# Patient Record
Sex: FEMALE | Race: WHITE | Employment: FULL TIME | URBAN - METROPOLITAN AREA
[De-identification: names, ages, dates, MRNs, and addresses within clinical notes are randomized per-mention and may not be internally consistent; named-entity substitution may affect disease eponyms.]

---

## 2022-09-09 PROBLEM — E66.9 OBESITY: Status: ACTIVE | Noted: 2022-09-09

## 2022-09-09 PROBLEM — N94.6 DYSMENORRHEA: Status: ACTIVE | Noted: 2022-09-09

## 2022-09-13 ENCOUNTER — OFFICE VISIT (OUTPATIENT)
Dept: OBGYN CLINIC | Age: 47
End: 2022-09-13
Payer: COMMERCIAL

## 2022-09-13 VITALS
SYSTOLIC BLOOD PRESSURE: 114 MMHG | WEIGHT: 166.5 LBS | BODY MASS INDEX: 25.23 KG/M2 | HEIGHT: 68 IN | DIASTOLIC BLOOD PRESSURE: 68 MMHG

## 2022-09-13 DIAGNOSIS — N95.1 MENOPAUSAL SYNDROME: ICD-10-CM

## 2022-09-13 DIAGNOSIS — N95.0 PMB (POSTMENOPAUSAL BLEEDING): Primary | ICD-10-CM

## 2022-09-13 PROCEDURE — 58100 BIOPSY OF UTERUS LINING: CPT | Performed by: OBSTETRICS & GYNECOLOGY

## 2022-09-13 RX ORDER — TOPIRAMATE 50 MG/1
TABLET, FILM COATED ORAL
COMMUNITY

## 2022-09-13 NOTE — PROGRESS NOTES
Wilberto Verma is a No obstetric history on file. , 41 y.o. female   No LMP recorded (lmp unknown). (Menstrual status: Menopause). She presents for her problem    She is having  3-4 days of spotting last week, no cycle for over 1 year, had HF and NS  initially awhile ago but have subsided, does note some hair loss . Menstrual status:  Cycles are menopausal.    Flow: spotting. She does not have dysmenorrhea. Medical conditions:  Since her last annual GYN exam about  ? years ago, she has not the following changes in her health history: none. Mammogram History:    NORMAN Results (most recent):  No results found for this or any previous visit. DEXA Results (most recent):  No results found for this or any previous visit. Past Medical History:   Diagnosis Date    Dysmenorrhea 09/09/2022    Obesity 09/09/2022    PMB (postmenopausal bleeding) 09/13/2022     History reviewed. No pertinent surgical history. Prior to Admission medications    Medication Sig Start Date End Date Taking? Authorizing Provider   topiramate (TOPAMAX) 50 mg tablet topiramate 50 mg tablet   Yes Provider, Historical       No Known Allergies       Tobacco History:  reports that she has never smoked. She has never used smokeless tobacco.  Alcohol use:  reports that she does not currently use alcohol. Drug use:  reports no history of drug use. Family Medical/Cancer History:   Family History   Family history unknown: Yes          Review of Systems   Constitutional:  Negative for chills, fever, malaise/fatigue and weight loss. HENT:  Negative for congestion, ear pain, sinus pain and tinnitus. Eyes:  Negative for blurred vision and double vision. Respiratory:  Negative for cough, shortness of breath and wheezing. Cardiovascular:  Negative for chest pain and palpitations. Gastrointestinal:  Negative for abdominal pain, blood in stool, constipation, diarrhea, heartburn, nausea and vomiting. Genitourinary:  Negative for dysuria, flank pain, frequency, hematuria and urgency. Musculoskeletal:  Negative for joint pain and myalgias. Skin:  Negative for itching and rash. Neurological:  Negative for dizziness, weakness and headaches. Psychiatric/Behavioral:  Negative for depression, memory loss and suicidal ideas. The patient is not nervous/anxious and does not have insomnia. Physical Exam  Constitutional:       Appearance: Normal appearance. HENT:      Head: Normocephalic and atraumatic. Abdominal:      General: Abdomen is flat. Palpations: Abdomen is soft. Genitourinary:     General: Normal vulva. Vagina: Normal.      Cervix: Normal.      Uterus: Normal.       Adnexa: Right adnexa normal and left adnexa normal.      Rectum: Normal.      Comments: No PAP obtained    After verbal consent and under sterile conditions and EMBX was performed- Anterior lip of cervix grasped, endocervical canal dilated, 2 passes made with minimal tissue, sounded to 8 cms, pt tolerated the EMBX without issue, watched for 5 minutes post  Neurological:      Mental Status: She is alert. Psychiatric:         Mood and Affect: Mood normal.         Behavior: Behavior normal.         Thought Content: Thought content normal.        Visit Vitals  /68 (BP 1 Location: Right arm, BP Patient Position: Sitting, BP Cuff Size: Small adult)   Ht 5' 8\" (1.727 m)   Wt 166 lb 8 oz (75.5 kg)   LMP  (LMP Unknown) Comment: MURTAZA   BMI 25.32 kg/m²         Assessment: Diagnoses and all orders for this visit:    1. PMB (postmenopausal bleeding)  -     SURGICAL PATHOLOGY    2. Menopausal syndrome    DWP Diff.  Dx, will call with PATH    Plan: Questions addressed  Counseled re: diet, exercise, healthy lifestyle  Return for Annual w/ pap  Rec annual mammogram

## 2022-09-13 NOTE — PROGRESS NOTES
Chief Complaint   Patient presents with    Post Menopausal Bleeding     Visit Vitals  Ht 5' 8\" (1.727 m)   Wt 166 lb 8 oz (75.5 kg)   BMI 25.32 kg/m²

## 2022-09-16 LAB
CPT DISCLAIMER: NORMAL
DIAGNOSIS SYNOPSIS:: NORMAL
DX ICD CODE: NORMAL
PATH REPORT.FINAL DX SPEC: NORMAL
PATH REPORT.GROSS SPEC: NORMAL
PATH REPORT.SITE OF ORIGIN SPEC: NORMAL
PATHOLOGIST NAME: NORMAL
PAYMENT PROCEDURE: NORMAL

## 2023-04-12 ENCOUNTER — TELEPHONE (OUTPATIENT)
Dept: OBGYN CLINIC | Age: 48
End: 2023-04-12

## 2023-04-20 ENCOUNTER — HOSPITAL ENCOUNTER (OUTPATIENT)
Dept: MAMMOGRAPHY | Age: 48
End: 2023-04-20
Attending: OBSTETRICS & GYNECOLOGY
Payer: COMMERCIAL

## 2023-04-20 ENCOUNTER — TRANSCRIBE ORDER (OUTPATIENT)
Dept: SCHEDULING | Age: 48
End: 2023-04-20

## 2023-04-20 ENCOUNTER — TELEPHONE (OUTPATIENT)
Dept: OBGYN CLINIC | Age: 48
End: 2023-04-20

## 2023-04-20 ENCOUNTER — HOSPITAL ENCOUNTER (OUTPATIENT)
Dept: MAMMOGRAPHY | Age: 48
Discharge: HOME | End: 2023-04-20
Attending: OBSTETRICS & GYNECOLOGY
Payer: COMMERCIAL

## 2023-04-20 DIAGNOSIS — N63.10 MASS OF RIGHT BREAST, UNSPECIFIED QUADRANT: ICD-10-CM

## 2023-04-20 DIAGNOSIS — R92.8 ABNORMAL MAMMOGRAM: Primary | ICD-10-CM

## 2023-04-20 PROCEDURE — 77062 BREAST TOMOSYNTHESIS BI: CPT

## 2023-04-20 PROCEDURE — 76642 ULTRASOUND BREAST LIMITED: CPT

## 2023-04-20 NOTE — PROGRESS NOTES
Abnormal right mammo and US  Rec;  Bx of both breast lesion and LN  Radiologist DWP   Orders sent for bx to be set up

## 2023-04-21 ENCOUNTER — OFFICE VISIT (OUTPATIENT)
Dept: OBGYN CLINIC | Age: 48
End: 2023-04-21

## 2023-04-21 VITALS
SYSTOLIC BLOOD PRESSURE: 114 MMHG | HEIGHT: 68 IN | DIASTOLIC BLOOD PRESSURE: 76 MMHG | BODY MASS INDEX: 24.86 KG/M2 | WEIGHT: 164 LBS

## 2023-04-21 DIAGNOSIS — Z12.4 SCREENING FOR MALIGNANT NEOPLASM OF CERVIX: Primary | ICD-10-CM

## 2023-04-21 DIAGNOSIS — Z01.419 ROUTINE GYNECOLOGICAL EXAMINATION: ICD-10-CM

## 2023-04-21 DIAGNOSIS — N63.15 MASS OVERLAPPING MULTIPLE QUADRANTS OF RIGHT BREAST: ICD-10-CM

## 2023-04-21 NOTE — PROGRESS NOTES
Karolina Varela is a , 41 y.o. female   No LMP recorded. (Menstrual status: Menopause). She presents for her annual    She is having no significant problems. Had mammogram -biopsy scheduled for next week      Menstrual status:  Cycles are menopausal.    Flow: absent. She does not have dysmenorrhea. Medical conditions:  Since her last annual GYN exam about one year ago, she has not the following changes in her health history: none. Mammogram History:    NORMAN Results (most recent):  Results from [place] encounter on 23    [facility] 3D LENCHO W MAMMO BI DX INCL CAD    Narrative  STUDY:  Bilateral Diagnostic Digital Mammography including 3-D Tomosynthesis,  with right breast ultrasound    INDICATION: Palpable abnormality in the right breast    COMPARISON:  Baseline study    BREAST COMPOSITION: There are scattered areas of fibroglandular density. FINDINGS: Bilateral digital diagnostic mammography was performed, and is  interpreted in conjunction with a computer assisted detection (CAD) system. In  addition to standard 2-D bilateral CC and MLO views, bilateral 3-D/tomosynthesis  was performed in CC and MLO projections. Implant displaced views were performed. Spot compression views were performed of the region of palpable concern, the  anterior third of the right breast upper inner quadrant. There is an irregular  ill-defined equal density mass in the region of palpable concern. There are no  suspicious findings within the left breast. There is no skin thickening or  nipple retraction. There are bilateral intact submuscular saline breast  implants. Right breast ultrasound was performed of the region of palpable concern, the  1:00 location, 3 cm from the nipple. There is a 1.5 cm irregular ill-defined  hypoechoic hypervascular mass accounting for the region of palpable concern. This represents a suspicious solid mass.  Survey imaging of the right axilla  demonstrates an abnormal level 1 axillary lymph node with diffuse cortical  thickening; this lymph node measures up to 1.3 x 0.9 x 1.1 cm. A submuscular  implant is confirmed. Impression  1. BI-RADS Assessment Category 5: Highly suggestive of malignancy- Appropriate  action should be taken. Palpable 1.5 cm solid mass in the upper inner quadrant  of the right breast, representing a highly suspicious breast mass. Ultrasound-guided core needle biopsy is recommended. 2. BI-RADS Assessment Category 4: Suspicious abnormality - Biopsy should be  performed in the absence of clinical contraindication. Single abnormal level 1  right axillary lymph node. Ultrasound-guided core needle biopsy is recommended. 3. No mammographic evidence of malignancy within the left breast.  4. Intact bilateral submuscular saline breast implants. Ultrasound-guided biopsy is recommended of the palpable right breast mass and of  the single abnormal right axillary lymph node. I have informed the patient of these results and recommendations. We will inform  the office of the referring physician and assist in scheduling these breast  biopsy appointments. DEXA Results (most recent):  No results found for this or any previous visit. Past Medical History:   Diagnosis Date    Dysmenorrhea 09/09/2022    Obesity 09/09/2022    PMB (postmenopausal bleeding) 09/13/2022     Past Surgical History:   Procedure Laterality Date    IMPLANT BREAST SILICONE/EQ         Prior to Admission medications    Medication Sig Start Date End Date Taking? Authorizing Provider   topiramate (TOPAMAX) 50 mg tablet topiramate 50 mg tablet   Yes Provider, Historical       No Known Allergies       Tobacco History:  reports that she has never smoked. She has never used smokeless tobacco.  Alcohol use:  reports that she does not currently use alcohol. Drug use:  reports no history of drug use. Family Medical/Cancer History:   Family History   Family history unknown:  Yes Review of Systems   Constitutional:  Negative for chills, fever, malaise/fatigue and weight loss. HENT:  Negative for congestion, ear pain, sinus pain and tinnitus. Eyes:  Negative for blurred vision and double vision. Respiratory:  Negative for cough, shortness of breath and wheezing. Cardiovascular:  Negative for chest pain and palpitations. Gastrointestinal:  Negative for abdominal pain, blood in stool, constipation, diarrhea, heartburn, nausea and vomiting. Genitourinary:  Negative for dysuria, flank pain, frequency, hematuria and urgency. Musculoskeletal:  Negative for joint pain and myalgias. Skin:  Negative for itching and rash. Neurological:  Negative for dizziness, weakness and headaches. Psychiatric/Behavioral:  Negative for depression, memory loss and suicidal ideas. The patient is not nervous/anxious and does not have insomnia. Physical Exam  Constitutional:       Appearance: Normal appearance. HENT:      Head: Normocephalic and atraumatic. Cardiovascular:      Rate and Rhythm: Normal rate. Heart sounds: Normal heart sounds. Pulmonary:      Effort: Pulmonary effort is normal.      Breath sounds: Normal breath sounds. Chest:   Breasts:     Right: Normal.      Left: Normal.          Comments: Implants  Abdominal:      General: Abdomen is flat. Palpations: Abdomen is soft. Genitourinary:     General: Normal vulva. Vagina: Normal.      Cervix: Normal.      Uterus: Normal.       Adnexa: Right adnexa normal and left adnexa normal.      Rectum: Normal.      Comments: PAP Obtained  Neurological:      Mental Status: She is alert. Psychiatric:         Mood and Affect: Mood normal.         Behavior: Behavior normal.         Thought Content:  Thought content normal.        Visit Vitals  /76 (BP 1 Location: Left upper arm, BP Patient Position: Sitting, BP Cuff Size: Small adult)   Ht 5' 8\" (1.727 m)   Wt 164 lb (74.4 kg)   BMI 24.94 kg/m² Assessment: Diagnoses and all orders for this visit:    1. Screening for malignant neoplasm of cervix  -     PAP IG, RFX APTIMA HPV ASCUS ([id number])    2. Routine gynecological examination  -     PAP IG, RFX APTIMA HPV ASCUS ([id number])    3. Mass overlapping multiple quadrants of right breast    Breast lump- bx next week scheduled    Plan: Questions addressed  Counseled re: diet, exercise, healthy lifestyle  Return for Annual  Rec annual mammogram        Follow-up and Dispositions    Return for 1 yr annual, 1 yr mammo.

## 2023-04-21 NOTE — PROGRESS NOTES
Chief Complaint   Patient presents with    Annual Exam     Mammo/us 4-20-23     Visit Vitals  /76 (BP 1 Location: Left upper arm, BP Patient Position: Sitting, BP Cuff Size: Small adult)   Ht 5' 8\" (1.727 m)   Wt 164 lb (74.4 kg)   BMI 24.94 kg/m²

## 2023-04-26 LAB
CYTOLOGIST CVX/VAG CYTO: NORMAL
CYTOLOGY CVX/VAG DOC CYTO: NORMAL
CYTOLOGY CVX/VAG DOC THIN PREP: NORMAL
DX ICD CODE: NORMAL
LABCORP, 190119: NORMAL
Lab: NORMAL
OTHER STN SPEC: NORMAL
STAT OF ADQ CVX/VAG CYTO-IMP: NORMAL

## 2023-04-27 ENCOUNTER — HOSPITAL ENCOUNTER (OUTPATIENT)
Dept: MAMMOGRAPHY | Age: 48
End: 2023-04-27
Attending: OBSTETRICS & GYNECOLOGY
Payer: COMMERCIAL

## 2023-04-27 DIAGNOSIS — R92.8 ABNORMAL MAMMOGRAM: ICD-10-CM

## 2023-04-27 PROCEDURE — 88305 TISSUE EXAM BY PATHOLOGIST: CPT

## 2023-04-27 PROCEDURE — 77030013882 US BX BREAST RT 1ST LESION W/CLIP AND SPECIMEN

## 2023-04-27 PROCEDURE — 38505 NEEDLE BIOPSY LYMPH NODES: CPT

## 2023-04-27 PROCEDURE — 88360 TUMOR IMMUNOHISTOCHEM/MANUAL: CPT

## 2023-04-27 PROCEDURE — 77065 DX MAMMO INCL CAD UNI: CPT

## 2023-04-27 PROCEDURE — 88342 IMHCHEM/IMCYTCHM 1ST ANTB: CPT

## 2023-04-27 RX ORDER — LIDOCAINE HYDROCHLORIDE AND EPINEPHRINE 10; 10 MG/ML; UG/ML
1.5 INJECTION, SOLUTION INFILTRATION; PERINEURAL
Status: DISPENSED | OUTPATIENT
Start: 2023-04-27 | End: 2023-04-27

## 2023-04-27 RX ORDER — LIDOCAINE HYDROCHLORIDE 10 MG/ML
1.5 INJECTION INFILTRATION; PERINEURAL
Status: DISPENSED | OUTPATIENT
Start: 2023-04-27 | End: 2023-04-27

## 2023-05-03 ENCOUNTER — TRANSCRIBE ORDER (OUTPATIENT)
Dept: SCHEDULING | Age: 48
End: 2023-05-03

## 2023-05-03 DIAGNOSIS — C50.911 HER2-POSITIVE CARCINOMA OF RIGHT BREAST (HCC): Primary | ICD-10-CM

## 2023-05-09 ENCOUNTER — HOSPITAL ENCOUNTER (OUTPATIENT)
Facility: HOSPITAL | Age: 48
Discharge: HOME | End: 2023-05-12
Payer: COMMERCIAL

## 2023-05-09 DIAGNOSIS — C50.911 HER2-POSITIVE CARCINOMA OF RIGHT BREAST (HCC): ICD-10-CM

## 2023-05-09 PROCEDURE — A9579 GAD-BASE MR CONTRAST NOS,1ML: HCPCS

## 2023-05-09 PROCEDURE — 6360000004 HC RX CONTRAST MEDICATION

## 2023-05-09 PROCEDURE — C8908 MRI W/O FOL W/CONT, BREAST,: HCPCS

## 2023-05-09 RX ORDER — SODIUM CHLORIDE 0.9 % (FLUSH) 0.9 %
10 SYRINGE (ML) INJECTION 2 TIMES DAILY
Status: DISCONTINUED | OUTPATIENT
Start: 2023-05-09 | End: 2023-05-13 | Stop reason: HOSPADM

## 2023-05-09 RX ORDER — 0.9 % SODIUM CHLORIDE 0.9 %
100 INTRAVENOUS SOLUTION INTRAVENOUS ONCE
Status: DISCONTINUED | OUTPATIENT
Start: 2023-05-09 | End: 2023-05-13 | Stop reason: HOSPADM

## 2023-05-09 RX ADMIN — GADOTERIDOL 15 ML: 279.3 INJECTION, SOLUTION INTRAVENOUS at 13:57

## 2023-05-10 ENCOUNTER — TELEPHONE (OUTPATIENT)
Facility: HOSPITAL | Age: 48
End: 2023-05-10

## 2023-05-10 NOTE — TELEPHONE ENCOUNTER
Called pt's PCP office to confirm whether she has been referred to a breast surgeon in f/u on breast MRI done yesterday. She has been referred to Dr. Matthew Trejo, [facility] AND [facility] breast surgeon. Called Dr. Mike Fletcher office to confirm patient has appointment there, she has an appointment this Friday, May 12th and orders for breast MRI in their system as well. I let them know that she had her breast MRI done here yesterday and we can transfer the result to them if they fax a release per HIPAA. They faxed a release while I was on the phone with them, appreciated my call.

## 2023-05-10 NOTE — TELEPHONE ENCOUNTER
[address]  Breast Cancer Nurse Navigator Encounter    Name: Hadley Fuentes  Age: 57 y.o.  : 1975  Diagnosis: Right breast IDC; ER-/AR-/HER2+    Encounter type:  [x]Initial Navigator Encounter  []Patient Initiated  []Navigator Follow-up  []Other:     Narrative:   Called pt to introduce self/role of NN and answer any questions she may have. She confirms having had consults and port placement with Dr. Stephy Lynne and a follow-up scheduled with medical oncology on Friday. No specific questions/concerns for NN at this time. Contact info given and pt encouraged to reach out if needed.      Interdisciplinary Team:  Surg-Onc: Fatuma Hammer MD  Med-Onc:  Rad-Onc:  Plastics:  :   Nurse Navigator: Dilip Knight, RN      Dilip Knight BSN, RN, VIA [facility]  Breast Cancer Nurse [address] [place] [address]  W: [phone]  F: [phone]  [email or web address]   Good Help to Those in [facility]

## 2023-05-24 RX ORDER — TOPIRAMATE 50 MG/1
TABLET, FILM COATED ORAL
COMMUNITY

## 2023-08-08 ENCOUNTER — HOSPITAL ENCOUNTER (OUTPATIENT)
Facility: HOSPITAL | Age: 48
Discharge: HOME | End: 2023-08-11
Attending: INTERNAL MEDICINE
Payer: COMMERCIAL

## 2023-08-08 DIAGNOSIS — Z51.11 ENCOUNTER FOR ANTINEOPLASTIC CHEMOTHERAPY: ICD-10-CM

## 2023-08-08 DIAGNOSIS — R93.5 ABNORMAL ABDOMINAL ULTRASOUND: ICD-10-CM

## 2023-08-08 DIAGNOSIS — Z51.12 ENCOUNTER FOR ANTINEOPLASTIC IMMUNOTHERAPY: ICD-10-CM

## 2023-08-08 DIAGNOSIS — K76.89 COMPENSATORY LOBAR HYPERPLASIA OF LIVER: ICD-10-CM

## 2023-08-08 DIAGNOSIS — C50.211 MALIGNANT NEOPLASM OF UPPER-INNER QUADRANT OF RIGHT FEMALE BREAST, UNSPECIFIED ESTROGEN RECEPTOR STATUS (HCC): ICD-10-CM

## 2023-08-08 PROCEDURE — C8908 MRI W/O FOL W/CONT, BREAST,: HCPCS

## 2023-08-08 PROCEDURE — 6360000004 HC RX CONTRAST MEDICATION

## 2023-08-08 PROCEDURE — A9579 GAD-BASE MR CONTRAST NOS,1ML: HCPCS

## 2023-08-08 PROCEDURE — 2580000003 HC RX 258: Performed by: INTERNAL MEDICINE

## 2023-08-08 RX ORDER — 0.9 % SODIUM CHLORIDE 0.9 %
100 INTRAVENOUS SOLUTION INTRAVENOUS ONCE
Status: COMPLETED | OUTPATIENT
Start: 2023-08-08 | End: 2023-08-08

## 2023-08-08 RX ADMIN — SODIUM CHLORIDE 100 ML: 9 INJECTION, SOLUTION INTRAVENOUS at 15:44

## 2023-08-08 RX ADMIN — GADOTERIDOL 15 ML: 279.3 INJECTION, SOLUTION INTRAVENOUS at 15:44

## 2023-10-18 ENCOUNTER — HOSPITAL ENCOUNTER (OUTPATIENT)
Facility: HOSPITAL | Age: 48
Discharge: HOME | End: 2023-10-21
Attending: INTERNAL MEDICINE
Payer: COMMERCIAL

## 2023-10-18 DIAGNOSIS — K76.89 COMPENSATORY LOBAR HYPERPLASIA OF LIVER: ICD-10-CM

## 2023-10-18 DIAGNOSIS — R93.5 ABNORMAL ABDOMINAL ULTRASOUND: ICD-10-CM

## 2023-10-18 DIAGNOSIS — Z51.12 ENCOUNTER FOR ANTINEOPLASTIC IMMUNOTHERAPY: ICD-10-CM

## 2023-10-18 DIAGNOSIS — Z51.11 ENCOUNTER FOR ANTINEOPLASTIC CHEMOTHERAPY: ICD-10-CM

## 2023-10-18 PROCEDURE — C8908 MRI W/O FOL W/CONT, BREAST,: HCPCS

## 2023-10-18 PROCEDURE — 6360000004 HC RX CONTRAST MEDICATION: Performed by: INTERNAL MEDICINE

## 2023-10-18 PROCEDURE — A9585 GADOBUTROL INJECTION: HCPCS | Performed by: INTERNAL MEDICINE

## 2023-10-18 RX ORDER — GADOBUTROL 604.72 MG/ML
7 INJECTION INTRAVENOUS
Status: COMPLETED | OUTPATIENT
Start: 2023-10-18 | End: 2023-10-18

## 2023-10-18 RX ADMIN — GADOBUTROL 7 ML: 604.72 INJECTION INTRAVENOUS at 10:34

## 2024-07-25 ENCOUNTER — OFFICE VISIT (OUTPATIENT)
Age: 49
End: 2024-07-25
Payer: COMMERCIAL

## 2024-07-25 VITALS
HEIGHT: 68 IN | DIASTOLIC BLOOD PRESSURE: 68 MMHG | SYSTOLIC BLOOD PRESSURE: 112 MMHG | WEIGHT: 158.25 LBS | BODY MASS INDEX: 23.98 KG/M2

## 2024-07-25 DIAGNOSIS — Z90.13 H/O BILATERAL MASTECTOMY: ICD-10-CM

## 2024-07-25 DIAGNOSIS — Z12.4 PAP SMEAR FOR CERVICAL CANCER SCREENING: Primary | ICD-10-CM

## 2024-07-25 DIAGNOSIS — Z01.419 GYNECOLOGIC EXAM NORMAL: ICD-10-CM

## 2024-07-25 DIAGNOSIS — Z85.3 PERSONAL HISTORY OF BREAST CANCER: ICD-10-CM

## 2024-07-25 PROCEDURE — 99396 PREV VISIT EST AGE 40-64: CPT | Performed by: OBSTETRICS & GYNECOLOGY

## 2024-07-25 ASSESSMENT — ENCOUNTER SYMPTOMS
GASTROINTESTINAL NEGATIVE: 1
RESPIRATORY NEGATIVE: 1

## 2024-07-25 NOTE — PROGRESS NOTES
Zeny Chandler is a No obstetric history on file., 49 y.o. female   No LMP recorded. (Menstrual status: Menopause).    She presents for her annual    She is having no significant problems.      Menstrual status:  Cycles are menopausal.    Flow: absent.      She does not have dysmenorrhea.      Medical conditions:  Since her last annual GYN exam about one year ago, she has not the following changes in her health history: none.     Mammogram History:    TIMOTHY Results (most recent):  @[employer]([id number])@     DEXA Results (most recent):  @[employer]([id number]:1)@       Past Medical History:   Diagnosis Date    Breast cancer (HCC)     Dysmenorrhea 09/09/2022    Obesity 09/09/2022    PMB (postmenopausal bleeding) 09/13/2022     Past Surgical History:   Procedure Laterality Date    IMPLANT BREAST SILICONE/EQ      MASTECTOMY, BILATERAL Bilateral 11/2023    US BREAST BIOPSY W LOC DEVICE 1ST LESION RIGHT Right 04/27/2023    US BREAST BIOPSY W LOC DEVICE 1ST LESION RIGHT 4/27/2023 [facility]    US LYMPH NODE BIOPSY  04/27/2023    US LYMPH NODE BIOPSY 4/27/2023 [facility]       Prior to Admission medications    Medication Sig Start Date End Date Taking? Authorizing Provider   topiramate (TOPAMAX) 50 MG tablet topiramate 50 mg tablet   Yes Automatic Reconciliation, Ar       No Known Allergies       Tobacco History:  reports that she has never smoked. She has never used smokeless tobacco.  Alcohol use:  reports that she does not currently use alcohol.  Drug use:  reports no history of drug use.    Family Medical/Cancer History:   Family History   Family history unknown: Yes        Review of Systems   Constitutional: Negative.    Respiratory: Negative.     Cardiovascular: Negative.    Gastrointestinal: Negative.    Genitourinary: Negative.    Musculoskeletal: Negative.    Neurological: Negative.    Psychiatric/Behavioral: Negative.           /68 (Site: Right Upper Arm, Position: Sitting, Cuff Size: Small

## 2024-07-25 NOTE — PROGRESS NOTES
Chief Complaint   Patient presents with    Annual Exam     Bilateral mastectomy     /68 (Site: Right Upper Arm, Position: Sitting, Cuff Size: Small Adult)   Ht 1.727 m (5' 8\")   Wt 71.8 kg (158 lb 4 oz)   BMI 24.06 kg/m²

## 2024-07-30 LAB
., LABCORP: NORMAL
CYTOLOGIST CVX/VAG CYTO: NORMAL
CYTOLOGY CVX/VAG DOC CYTO: NORMAL
CYTOLOGY CVX/VAG DOC THIN PREP: NORMAL
DX ICD CODE: NORMAL
Lab: NORMAL
OTHER STN SPEC: NORMAL
STAT OF ADQ CVX/VAG CYTO-IMP: NORMAL

## 2025-08-27 ENCOUNTER — ANESTHESIA EVENT (OUTPATIENT)
Facility: HOSPITAL | Age: 50
End: 2025-08-27
Payer: COMMERCIAL

## 2025-08-27 ENCOUNTER — ANESTHESIA (OUTPATIENT)
Facility: HOSPITAL | Age: 50
End: 2025-08-27
Payer: COMMERCIAL

## 2025-08-27 ENCOUNTER — HOSPITAL ENCOUNTER (OUTPATIENT)
Facility: HOSPITAL | Age: 50
Discharge: HOME | End: 2025-08-27
Attending: SURGERY | Admitting: SURGERY
Payer: COMMERCIAL

## 2025-08-27 VITALS
RESPIRATION RATE: 16 BRPM | HEART RATE: 70 BPM | DIASTOLIC BLOOD PRESSURE: 87 MMHG | HEIGHT: 71 IN | WEIGHT: 160 LBS | TEMPERATURE: 98.1 F | SYSTOLIC BLOOD PRESSURE: 120 MMHG | OXYGEN SATURATION: 95 % | BODY MASS INDEX: 22.4 KG/M2

## 2025-08-27 DIAGNOSIS — C77.9 LYMPH NODE CANCER (HCC): Primary | ICD-10-CM

## 2025-08-27 DIAGNOSIS — C77.9 MALIGNANT NEOPLASM OF LYMPH NODE (HCC): ICD-10-CM

## 2025-08-27 PROCEDURE — 88305 TISSUE EXAM BY PATHOLOGIST: CPT

## 2025-08-27 PROCEDURE — 3600000003 HC SURGERY LEVEL 3 BASE: Performed by: SURGERY

## 2025-08-27 PROCEDURE — 7100000010 HC PHASE II RECOVERY - FIRST 15 MIN: Performed by: SURGERY

## 2025-08-27 PROCEDURE — 7100000001 HC PACU RECOVERY - ADDTL 15 MIN: Performed by: SURGERY

## 2025-08-27 PROCEDURE — 6360000002 HC RX W HCPCS: Performed by: SURGERY

## 2025-08-27 PROCEDURE — 7100000011 HC PHASE II RECOVERY - ADDTL 15 MIN: Performed by: SURGERY

## 2025-08-27 PROCEDURE — 2500000003 HC RX 250 WO HCPCS: Performed by: SURGERY

## 2025-08-27 PROCEDURE — 88342 IMHCHEM/IMCYTCHM 1ST ANTB: CPT

## 2025-08-27 PROCEDURE — 88333 PATH CONSLTJ SURG CYTO XM 1: CPT

## 2025-08-27 PROCEDURE — 2720000010 HC SURG SUPPLY STERILE: Performed by: SURGERY

## 2025-08-27 PROCEDURE — 3700000000 HC ANESTHESIA ATTENDED CARE: Performed by: SURGERY

## 2025-08-27 PROCEDURE — 7100000000 HC PACU RECOVERY - FIRST 15 MIN: Performed by: SURGERY

## 2025-08-27 PROCEDURE — 88360 TUMOR IMMUNOHISTOCHEM/MANUAL: CPT

## 2025-08-27 PROCEDURE — 2580000003 HC RX 258: Performed by: SURGERY

## 2025-08-27 PROCEDURE — 6370000000 HC RX 637 (ALT 250 FOR IP): Performed by: ANESTHESIOLOGY

## 2025-08-27 PROCEDURE — 6360000002 HC RX W HCPCS

## 2025-08-27 PROCEDURE — 3600000013 HC SURGERY LEVEL 3 ADDTL 15MIN: Performed by: SURGERY

## 2025-08-27 PROCEDURE — 88341 IMHCHEM/IMCYTCHM EA ADD ANTB: CPT

## 2025-08-27 PROCEDURE — 3700000001 HC ADD 15 MINUTES (ANESTHESIA): Performed by: SURGERY

## 2025-08-27 PROCEDURE — 2709999900 HC NON-CHARGEABLE SUPPLY: Performed by: SURGERY

## 2025-08-27 RX ORDER — BUPIVACAINE HYDROCHLORIDE 2.5 MG/ML
INJECTION, SOLUTION EPIDURAL; INFILTRATION; INTRACAUDAL; PERINEURAL PRN
Status: DISCONTINUED | OUTPATIENT
Start: 2025-08-27 | End: 2025-08-27 | Stop reason: HOSPADM

## 2025-08-27 RX ORDER — SODIUM CHLORIDE 0.9 % (FLUSH) 0.9 %
5-40 SYRINGE (ML) INJECTION PRN
Status: DISCONTINUED | OUTPATIENT
Start: 2025-08-27 | End: 2025-08-27 | Stop reason: HOSPADM

## 2025-08-27 RX ORDER — DEXAMETHASONE SODIUM PHOSPHATE 4 MG/ML
INJECTION, SOLUTION INTRA-ARTICULAR; INTRALESIONAL; INTRAMUSCULAR; INTRAVENOUS; SOFT TISSUE
Status: DISCONTINUED | OUTPATIENT
Start: 2025-08-27 | End: 2025-08-27 | Stop reason: SDUPTHER

## 2025-08-27 RX ORDER — GLUCAGON 1 MG/ML
1 KIT INJECTION PRN
Status: DISCONTINUED | OUTPATIENT
Start: 2025-08-27 | End: 2025-08-27 | Stop reason: HOSPADM

## 2025-08-27 RX ORDER — HYDROCODONE BITARTRATE AND ACETAMINOPHEN 5; 325 MG/1; MG/1
1 TABLET ORAL EVERY 6 HOURS PRN
Refills: 0 | Status: DISCONTINUED | OUTPATIENT
Start: 2025-08-27 | End: 2025-08-27 | Stop reason: HOSPADM

## 2025-08-27 RX ORDER — SODIUM CHLORIDE, SODIUM LACTATE, POTASSIUM CHLORIDE, CALCIUM CHLORIDE 600; 310; 30; 20 MG/100ML; MG/100ML; MG/100ML; MG/100ML
INJECTION, SOLUTION INTRAVENOUS ONCE
Status: DISCONTINUED | OUTPATIENT
Start: 2025-08-27 | End: 2025-08-27 | Stop reason: HOSPADM

## 2025-08-27 RX ORDER — SODIUM CHLORIDE, SODIUM LACTATE, POTASSIUM CHLORIDE, CALCIUM CHLORIDE 600; 310; 30; 20 MG/100ML; MG/100ML; MG/100ML; MG/100ML
INJECTION, SOLUTION INTRAVENOUS CONTINUOUS
Status: DISCONTINUED | OUTPATIENT
Start: 2025-08-27 | End: 2025-08-27 | Stop reason: HOSPADM

## 2025-08-27 RX ORDER — PHENYLEPHRINE HCL IN 0.9% NACL 1 MG/10 ML
SYRINGE (ML) INTRAVENOUS
Status: DISCONTINUED | OUTPATIENT
Start: 2025-08-27 | End: 2025-08-27 | Stop reason: SDUPTHER

## 2025-08-27 RX ORDER — MIDAZOLAM HYDROCHLORIDE 1 MG/ML
INJECTION, SOLUTION INTRAMUSCULAR; INTRAVENOUS
Status: DISCONTINUED | OUTPATIENT
Start: 2025-08-27 | End: 2025-08-27 | Stop reason: SDUPTHER

## 2025-08-27 RX ORDER — LABETALOL HYDROCHLORIDE 5 MG/ML
10 INJECTION, SOLUTION INTRAVENOUS
Status: DISCONTINUED | OUTPATIENT
Start: 2025-08-27 | End: 2025-08-27 | Stop reason: HOSPADM

## 2025-08-27 RX ORDER — HYDROCODONE BITARTRATE AND ACETAMINOPHEN 5; 325 MG/1; MG/1
1 TABLET ORAL EVERY 6 HOURS PRN
Qty: 15 TABLET | Refills: 0 | Status: SHIPPED | OUTPATIENT
Start: 2025-08-27 | End: 2025-09-03

## 2025-08-27 RX ORDER — HYDRALAZINE HYDROCHLORIDE 20 MG/ML
10 INJECTION INTRAMUSCULAR; INTRAVENOUS
Status: DISCONTINUED | OUTPATIENT
Start: 2025-08-27 | End: 2025-08-27 | Stop reason: HOSPADM

## 2025-08-27 RX ORDER — PROPOFOL 10 MG/ML
INJECTION, EMULSION INTRAVENOUS
Status: DISCONTINUED | OUTPATIENT
Start: 2025-08-27 | End: 2025-08-27 | Stop reason: SDUPTHER

## 2025-08-27 RX ORDER — SODIUM CHLORIDE 0.9 % (FLUSH) 0.9 %
5-40 SYRINGE (ML) INJECTION EVERY 12 HOURS SCHEDULED
Status: DISCONTINUED | OUTPATIENT
Start: 2025-08-27 | End: 2025-08-27 | Stop reason: HOSPADM

## 2025-08-27 RX ORDER — LORAZEPAM 2 MG/ML
0.5 INJECTION INTRAMUSCULAR
Status: DISCONTINUED | OUTPATIENT
Start: 2025-08-27 | End: 2025-08-27 | Stop reason: HOSPADM

## 2025-08-27 RX ORDER — LIDOCAINE HYDROCHLORIDE 20 MG/ML
INJECTION, SOLUTION EPIDURAL; INFILTRATION; INTRACAUDAL; PERINEURAL
Status: DISCONTINUED | OUTPATIENT
Start: 2025-08-27 | End: 2025-08-27 | Stop reason: SDUPTHER

## 2025-08-27 RX ORDER — OXYCODONE HYDROCHLORIDE 5 MG/1
5 TABLET ORAL PRN
Status: COMPLETED | OUTPATIENT
Start: 2025-08-27 | End: 2025-08-27

## 2025-08-27 RX ORDER — MEPERIDINE HYDROCHLORIDE 25 MG/ML
12.5 INJECTION INTRAMUSCULAR; INTRAVENOUS; SUBCUTANEOUS EVERY 5 MIN PRN
Status: DISCONTINUED | OUTPATIENT
Start: 2025-08-27 | End: 2025-08-27 | Stop reason: HOSPADM

## 2025-08-27 RX ORDER — METOCLOPRAMIDE HYDROCHLORIDE 5 MG/ML
10 INJECTION INTRAMUSCULAR; INTRAVENOUS
Status: DISCONTINUED | OUTPATIENT
Start: 2025-08-27 | End: 2025-08-27 | Stop reason: HOSPADM

## 2025-08-27 RX ORDER — ONDANSETRON 2 MG/ML
4 INJECTION INTRAMUSCULAR; INTRAVENOUS
Status: DISCONTINUED | OUTPATIENT
Start: 2025-08-27 | End: 2025-08-27 | Stop reason: HOSPADM

## 2025-08-27 RX ORDER — ONDANSETRON 2 MG/ML
INJECTION INTRAMUSCULAR; INTRAVENOUS
Status: DISCONTINUED | OUTPATIENT
Start: 2025-08-27 | End: 2025-08-27 | Stop reason: SDUPTHER

## 2025-08-27 RX ORDER — FENTANYL CITRATE 50 UG/ML
INJECTION, SOLUTION INTRAMUSCULAR; INTRAVENOUS
Status: DISCONTINUED | OUTPATIENT
Start: 2025-08-27 | End: 2025-08-27 | Stop reason: SDUPTHER

## 2025-08-27 RX ORDER — IPRATROPIUM BROMIDE AND ALBUTEROL SULFATE 2.5; .5 MG/3ML; MG/3ML
1 SOLUTION RESPIRATORY (INHALATION)
Status: DISCONTINUED | OUTPATIENT
Start: 2025-08-27 | End: 2025-08-27 | Stop reason: HOSPADM

## 2025-08-27 RX ORDER — SODIUM CHLORIDE 9 MG/ML
INJECTION, SOLUTION INTRAVENOUS PRN
Status: DISCONTINUED | OUTPATIENT
Start: 2025-08-27 | End: 2025-08-27 | Stop reason: HOSPADM

## 2025-08-27 RX ORDER — FENTANYL CITRATE 0.05 MG/ML
50 INJECTION, SOLUTION INTRAMUSCULAR; INTRAVENOUS EVERY 5 MIN PRN
Status: DISCONTINUED | OUTPATIENT
Start: 2025-08-27 | End: 2025-08-27 | Stop reason: HOSPADM

## 2025-08-27 RX ORDER — DEXTROSE MONOHYDRATE 100 MG/ML
INJECTION, SOLUTION INTRAVENOUS CONTINUOUS PRN
Status: DISCONTINUED | OUTPATIENT
Start: 2025-08-27 | End: 2025-08-27 | Stop reason: HOSPADM

## 2025-08-27 RX ORDER — DIPHENHYDRAMINE HYDROCHLORIDE 50 MG/ML
12.5 INJECTION, SOLUTION INTRAMUSCULAR; INTRAVENOUS
Status: DISCONTINUED | OUTPATIENT
Start: 2025-08-27 | End: 2025-08-27 | Stop reason: HOSPADM

## 2025-08-27 RX ORDER — OXYCODONE HYDROCHLORIDE 5 MG/1
10 TABLET ORAL PRN
Status: COMPLETED | OUTPATIENT
Start: 2025-08-27 | End: 2025-08-27

## 2025-08-27 RX ORDER — LIDOCAINE 4 G/G
1 PATCH TOPICAL AS NEEDED
Status: DISCONTINUED | OUTPATIENT
Start: 2025-08-27 | End: 2025-08-27 | Stop reason: HOSPADM

## 2025-08-27 RX ORDER — LIDOCAINE HYDROCHLORIDE AND EPINEPHRINE 10; 10 MG/ML; UG/ML
INJECTION, SOLUTION INFILTRATION; PERINEURAL PRN
Status: DISCONTINUED | OUTPATIENT
Start: 2025-08-27 | End: 2025-08-27 | Stop reason: HOSPADM

## 2025-08-27 RX ORDER — HYDROMORPHONE HYDROCHLORIDE 1 MG/ML
0.5 INJECTION, SOLUTION INTRAMUSCULAR; INTRAVENOUS; SUBCUTANEOUS EVERY 5 MIN PRN
Status: DISCONTINUED | OUTPATIENT
Start: 2025-08-27 | End: 2025-08-27 | Stop reason: HOSPADM

## 2025-08-27 RX ADMIN — FENTANYL CITRATE 25 MCG: 50 INJECTION INTRAMUSCULAR; INTRAVENOUS at 10:20

## 2025-08-27 RX ADMIN — ONDANSETRON 4 MG: 2 INJECTION, SOLUTION INTRAMUSCULAR; INTRAVENOUS at 10:25

## 2025-08-27 RX ADMIN — Medication 50 MCG: at 09:44

## 2025-08-27 RX ADMIN — SODIUM CHLORIDE, POTASSIUM CHLORIDE, SODIUM LACTATE AND CALCIUM CHLORIDE: 600; 310; 30; 20 INJECTION, SOLUTION INTRAVENOUS at 09:29

## 2025-08-27 RX ADMIN — MIDAZOLAM 2 MG: 1 INJECTION INTRAMUSCULAR; INTRAVENOUS at 09:29

## 2025-08-27 RX ADMIN — Medication 50 MCG: at 10:07

## 2025-08-27 RX ADMIN — Medication 50 MCG: at 10:12

## 2025-08-27 RX ADMIN — CEFAZOLIN 2000 MG: 1 INJECTION, POWDER, FOR SOLUTION INTRAMUSCULAR; INTRAVENOUS at 09:29

## 2025-08-27 RX ADMIN — PROPOFOL 200 MG: 10 INJECTION, EMULSION INTRAVENOUS at 09:34

## 2025-08-27 RX ADMIN — LIDOCAINE HYDROCHLORIDE 70 MG: 20 INJECTION, SOLUTION EPIDURAL; INFILTRATION; INTRACAUDAL; PERINEURAL at 09:34

## 2025-08-27 RX ADMIN — FENTANYL CITRATE 25 MCG: 50 INJECTION INTRAMUSCULAR; INTRAVENOUS at 10:33

## 2025-08-27 RX ADMIN — DEXAMETHASONE SODIUM PHOSPHATE 4 MG: 4 INJECTION, SOLUTION INTRA-ARTICULAR; INTRALESIONAL; INTRAMUSCULAR; INTRAVENOUS; SOFT TISSUE at 09:42

## 2025-08-27 RX ADMIN — Medication 50 MCG: at 09:47

## 2025-08-27 RX ADMIN — FENTANYL CITRATE 25 MCG: 50 INJECTION INTRAMUSCULAR; INTRAVENOUS at 09:34

## 2025-08-27 RX ADMIN — Medication 50 MCG: at 10:00

## 2025-08-27 RX ADMIN — OXYCODONE HYDROCHLORIDE 5 MG: 5 TABLET ORAL at 11:52

## 2025-08-27 RX ADMIN — FENTANYL CITRATE 25 MCG: 50 INJECTION INTRAMUSCULAR; INTRAVENOUS at 09:39

## 2025-08-27 RX ADMIN — ONDANSETRON 4 MG: 2 INJECTION, SOLUTION INTRAMUSCULAR; INTRAVENOUS at 09:34

## 2025-08-27 RX ADMIN — Medication 50 MCG: at 09:56

## 2025-08-27 ASSESSMENT — PAIN DESCRIPTION - LOCATION: LOCATION: NECK

## 2025-08-27 ASSESSMENT — PAIN - FUNCTIONAL ASSESSMENT
PAIN_FUNCTIONAL_ASSESSMENT: 0-10

## 2025-08-27 ASSESSMENT — PAIN SCALES - GENERAL: PAINLEVEL_OUTOF10: 5

## 2025-08-27 ASSESSMENT — PAIN DESCRIPTION - DESCRIPTORS
DESCRIPTORS: ACHING
DESCRIPTORS: SORE
DESCRIPTORS: ACHING
DESCRIPTORS: ACHING

## 2025-08-27 ASSESSMENT — PAIN DESCRIPTION - ORIENTATION: ORIENTATION: LEFT

## (undated) DEVICE — SYRINGE MED 10ML LUERLOCK TIP W/O SFTY DISP

## (undated) DEVICE — GLOVE SURG SZ 7 L12IN FNGR THK79MIL GRN LTX FREE

## (undated) DEVICE — BLADE SURG 15 TWIN BK CARBON STL STRL CISION LF DISP

## (undated) DEVICE — DRAPE TOP ABSRB REINF HK AND LOOP LN HLDR ADH ALONG REINF

## (undated) DEVICE — ADHESIVE SKIN CLOSURE TOP 0.8 CC PREM PUR LIQUIBAND RAPID LF

## (undated) DEVICE — SOLUTION IV 500ML 0.9% SOD BOTTLE CHL LTWT DURABLE SHATTERPROOF

## (undated) DEVICE — SUTURE MONOCRYL + SZ 4-0 L27IN ABSRB UD L19MM PS-2 3/8 CIR MCP426H

## (undated) DEVICE — GLOVE SURG SZ 65 CRM LTX FREE POLYISOPRENE POLYMER BEAD ANTI

## (undated) DEVICE — DISSECTOR ENDOSCP L21CM TIP CURVATURE 40DEG FN CRV JAW VES

## (undated) DEVICE — Device

## (undated) DEVICE — NEEDLE HYPO 25GA L1.5IN ORNG HNG NDL-PRO

## (undated) DEVICE — DRAPE INSTR W16XL20IN GRN FOAM BK MAG NONSLIP DISP

## (undated) DEVICE — SUTURE VICRYL + SZ 3-0 L27IN ABSRB UD L26MM SH 1/2 CIR VCP416H

## (undated) DEVICE — CONTAINER SPEC 4OZ POLYPR GRAD ATTCH LBL LEAK RESISTANCE

## (undated) DEVICE — SYRINGE IRRIG 60ML SFT PLIABLE BLB EZ TO GRP 1 HND USE W/

## (undated) DEVICE — SUTURE PERMA-HAND SZ 2-0 L30IN NONABSORBABLE BLK L26MM SH K833H

## (undated) DEVICE — SUTURE PROL SZ 2-0 L30IN NONABSORBABLE BLU L26MM SH 1/2 CIR 8833H

## (undated) DEVICE — AGENT HEMSTAT W4XL4IN OXIDIZED REGENERATED CELOS ABSRB SFT

## (undated) DEVICE — PENCIL SMK EVAC BLADE COAT 70 MM BUTTON SWITCH NEPTUNE E-SEP